# Patient Record
Sex: MALE | Race: WHITE | NOT HISPANIC OR LATINO | Employment: FULL TIME | URBAN - METROPOLITAN AREA
[De-identification: names, ages, dates, MRNs, and addresses within clinical notes are randomized per-mention and may not be internally consistent; named-entity substitution may affect disease eponyms.]

---

## 2023-08-03 ENCOUNTER — HOSPITAL ENCOUNTER (EMERGENCY)
Facility: HOSPITAL | Age: 33
Discharge: HOME | End: 2023-08-04
Attending: EMERGENCY MEDICINE
Payer: COMMERCIAL

## 2023-08-03 VITALS
SYSTOLIC BLOOD PRESSURE: 141 MMHG | BODY MASS INDEX: 28.73 KG/M2 | RESPIRATION RATE: 18 BRPM | OXYGEN SATURATION: 100 % | WEIGHT: 194 LBS | HEART RATE: 91 BPM | DIASTOLIC BLOOD PRESSURE: 99 MMHG | TEMPERATURE: 95.5 F | HEIGHT: 69 IN

## 2023-08-03 DIAGNOSIS — S31.31XA LACERATION OF SCROTUM, INITIAL ENCOUNTER: Primary | ICD-10-CM

## 2023-08-03 PROCEDURE — 99282 EMERGENCY DEPT VISIT SF MDM: CPT

## 2023-08-04 NOTE — [facility] PROVIDER NOTE
Subjective   History of Present Illness  The patient is a 33-year-old male with a chief complaint of bleeding from the scrotum.  He states that he had a small pimple on the scrotum and was shaving and he the area.  He states that there was large amount of bleeding and it bled for approximately 30 minutes.  He denies any pain to the area.  No other injuries.  Denies any history of genital herpes.  Review of Systems   Genitourinary:         Bleeding from scrotum   All other systems reviewed and are negative.    History reviewed. No pertinent past medical history.    No Known Allergies    No past surgical history on file.    History reviewed. No pertinent family history.    Social History     Socioeconomic History    Marital status:            Objective   Physical Exam  Vitals reviewed. Exam conducted with a chaperone present.   Constitutional:       General: He is not in acute distress.     Appearance: He is well-developed. He is not ill-appearing.   Pulmonary:      Effort: No respiratory distress.   Genitourinary:     Comments: Small amount of dried blood on the scrotum and area surrounding possible small skin tag.  No active bleeding  Musculoskeletal:         General: Normal range of motion.   Skin:     General: Skin is dry.   Neurological:      General: No focal deficit present.      Mental Status: He is alert.   Psychiatric:         Mood and Affect: Mood normal.       Procedures           ED Course                                           Medical Decision Making  33-year-old male presents with bleeding from the scrotum after shaving.  No active bleeding.  Recommended against shaving.  Recommended direct nonstop pressure if bleeding recurs.    Final diagnoses:   Laceration of scrotum, initial encounter       ED Disposition  ED Disposition       ED Disposition   Discharge    Condition   Stable    Comment   --               Provider, No Known  [facility]  [id number]  [phone]               Medication List      No changes were made to your prescriptions during this visit.